# Patient Record
Sex: FEMALE | Race: WHITE | Employment: FULL TIME | URBAN - METROPOLITAN AREA
[De-identification: names, ages, dates, MRNs, and addresses within clinical notes are randomized per-mention and may not be internally consistent; named-entity substitution may affect disease eponyms.]

---

## 2017-11-21 ENCOUNTER — OFFICE VISIT (OUTPATIENT)
Dept: FAMILY MEDICINE CLINIC | Age: 22
End: 2017-11-21

## 2017-11-21 ENCOUNTER — TELEPHONE (OUTPATIENT)
Dept: FAMILY MEDICINE CLINIC | Age: 22
End: 2017-11-21

## 2017-11-21 VITALS
WEIGHT: 216.13 LBS | HEIGHT: 65 IN | HEART RATE: 68 BPM | DIASTOLIC BLOOD PRESSURE: 74 MMHG | TEMPERATURE: 98.2 F | SYSTOLIC BLOOD PRESSURE: 138 MMHG | BODY MASS INDEX: 36.01 KG/M2

## 2017-11-21 DIAGNOSIS — I49.9 CARDIAC ARRHYTHMIA, UNSPECIFIED CARDIAC ARRHYTHMIA TYPE: ICD-10-CM

## 2017-11-21 DIAGNOSIS — G44.89 ALLERGIC HEADACHE: ICD-10-CM

## 2017-11-21 DIAGNOSIS — J45.20 MILD INTERMITTENT ASTHMA WITHOUT COMPLICATION: Primary | ICD-10-CM

## 2017-11-21 PROCEDURE — 99203 OFFICE O/P NEW LOW 30 MIN: CPT | Performed by: FAMILY MEDICINE

## 2017-11-21 RX ORDER — FEXOFENADINE HCL 180 MG/1
180 TABLET ORAL DAILY
Qty: 30 TABLET | Refills: 3 | Status: SHIPPED | OUTPATIENT
Start: 2017-11-21 | End: 2019-02-19

## 2017-11-21 ASSESSMENT — ENCOUNTER SYMPTOMS: SHORTNESS OF BREATH: 0

## 2017-11-21 NOTE — PROGRESS NOTES
Alcohol use Yes      Comment: socially    Drug use: No    Sexual activity: Yes     Partners: Male     Other Topics Concern    None     Social History Narrative    None     Family History   Problem Relation Age of Onset    No Known Problems Mother     No Known Problems Father     No Known Problems Brother     No Known Problems Maternal Grandmother     Diabetes Maternal Grandfather     Prostate Cancer Maternal Grandfather     Cancer Maternal Grandfather      skin    Alzheimer's Disease Paternal Grandmother     Liver Cancer Paternal Grandfather     Alcohol Abuse Paternal Grandfather                               Review Of Systems    Review of Systems   Constitutional: Negative for chills and fever. Respiratory: Negative for shortness of breath. Cardiovascular: Negative for chest pain. Psychiatric/Behavioral: Negative for behavioral problems. PHYSICAL EXAMINATION:    /74 (Site: Right Arm, Position: Sitting, Cuff Size: Medium Adult)   Pulse 68   Temp 98.2 °F (36.8 °C) (Oral)   Ht 5' 4.5\" (1.638 m)   Wt 216 lb 2 oz (98 kg)   LMP 11/03/2017   Breastfeeding? No   BMI 36.52 kg/m²     Physical Exam   Constitutional: She is oriented to person, place, and time. She appears well-developed and well-nourished. No distress. HENT:   Head: Normocephalic and atraumatic. Eyes: Conjunctivae and EOM are normal.   Cardiovascular: Normal rate, regular rhythm and normal heart sounds. Pulmonary/Chest: Effort normal and breath sounds normal.   Neurological: She is alert and oriented to person, place, and time. Skin: Skin is warm and dry. She is not diaphoretic. Psychiatric: She has a normal mood and affect. Vitals reviewed. ASSESSMENT:   Well Adult, See encounter diagnoses  Nikia Hamm was seen today for new patient. Diagnoses and all orders for this visit:    Mild intermittent asthma without complication  Stable.  Continue albuterol PRN    Cardiac arrhythmia, unspecified cardiac arrhythmia type  -     Mihai Hutton MD  -     TSH WITH REFLEX TO FT4    Allergic headache  -     fexofenadine (ALLEGRA ALLERGY) 180 MG tablet; Take 1 tablet by mouth daily          Plan:   See orders and medications filed with this encounter. Labs checked per orders.     Declines flu and Tdap  Return in about 3 months (around 2/21/2018) for physical exam.

## 2017-12-01 ENCOUNTER — TELEPHONE (OUTPATIENT)
Dept: FAMILY MEDICINE CLINIC | Age: 22
End: 2017-12-01

## 2017-12-01 NOTE — TELEPHONE ENCOUNTER
Patient went to see her GYN for birth control and he prescribed her norethindrone. Patient is calling to see if it is okay for her to take this with her blood pressure issues.

## 2017-12-06 NOTE — TELEPHONE ENCOUNTER
Please inform patient that I do not recommend that she take the [facility] with her elevated BP.  Have her discussed with her GYN other options for [facility]

## 2017-12-07 ENCOUNTER — CLINICAL DOCUMENTATION (OUTPATIENT)
Dept: FAMILY MEDICINE CLINIC | Age: 22
End: 2017-12-07

## 2017-12-07 ENCOUNTER — TELEPHONE (OUTPATIENT)
Dept: FAMILY MEDICINE CLINIC | Age: 22
End: 2017-12-07

## 2018-01-02 ENCOUNTER — OFFICE VISIT (OUTPATIENT)
Dept: CARDIOLOGY CLINIC | Age: 23
End: 2018-01-02

## 2018-01-02 VITALS
HEART RATE: 104 BPM | WEIGHT: 220 LBS | OXYGEN SATURATION: 98 % | DIASTOLIC BLOOD PRESSURE: 88 MMHG | HEIGHT: 65 IN | BODY MASS INDEX: 36.65 KG/M2 | SYSTOLIC BLOOD PRESSURE: 130 MMHG

## 2018-01-02 DIAGNOSIS — R00.2 PALPITATIONS: Primary | ICD-10-CM

## 2018-01-02 DIAGNOSIS — Z76.89 ESTABLISHING CARE WITH NEW DOCTOR, ENCOUNTER FOR: ICD-10-CM

## 2018-01-02 PROCEDURE — 93000 ELECTROCARDIOGRAM COMPLETE: CPT | Performed by: INTERNAL MEDICINE

## 2018-01-02 PROCEDURE — 99203 OFFICE O/P NEW LOW 30 MIN: CPT | Performed by: INTERNAL MEDICINE

## 2018-01-02 NOTE — PROGRESS NOTES
[facility]  [phone]      Attending Physician: No att. providers found  Reason for Consultation/Chief Complaint: Palpitations    Subjective   History of Present Illness:  Veronica Cornelius is a 20 y.o. patient who presented to the hospital with UTI in April 2017. She was told she had an irregular heart rate at that time. She states she has a fast heart rate at times and feels like a machine gun. States it lasts minutes and occurs approximately once a week. She has no chest pain with this. There are no known triggers. Denies CP, SOB, syncope and dizziness. Past Medical History:   has a past medical history of Asthma and Headache. Surgical History:   has a past surgical history that includes [place] tooth extraction. Social History:   reports that she has never smoked. She has never used smokeless tobacco. She reports that she drinks alcohol. She reports that she does not use drugs. Family History:  family history includes Alcohol Abuse in her paternal grandfather; Alzheimer's Disease in her paternal grandmother; Cancer in her maternal grandfather; Diabetes in her maternal grandfather; [place] B[place] in her paternal grandfather; No Known Problems in her brother, father, maternal grandmother, and mother; Prostate Cancer in her maternal grandfather. Home Medications:  Were reviewed and are listed in nursing record and/or below  Prior to Admission medications    Medication Sig Start Date End Date Taking? Authorizing Provider   norethindrone (AYGESTIN) 5 MG tablet Take 5 mg by mouth daily   Yes Historical Provider, MD   fexofenadine (ALLEGRA ALLERGY) 180 MG tablet Take 1 tablet by mouth daily 11/21/17  Yes Desiree Payan MD        CURRENT Medications:    No current facility-administered medications for this visit. Allergies:  Review of patient's allergies indicates no known allergies. Review of Systems:   A 14 point review of symptoms completed.  Pertinent positives identified in the HPI, all other review of symptoms negative as below. Objective   PHYSICAL EXAM:    Vitals:    18 0926   BP: 130/88   Pulse: 104   SpO2: 98%    Weight: 220 lb (99.8 kg)       General Appearance:  Alert, cooperative, no distress, appears stated age   Head:  Normocephalic, without obvious abnormality, atraumatic   Eyes:  PERRL, conjunctiva/corneas clear   Nose: Nares normal, no drainage or sinus tenderness   Throat: Lips, mucosa, and tongue normal   Neck: Supple, symmetrical, trachea midline, no adenopathy, thyroid: not enlarged, symmetric, no tenderness/mass/nodules, no carotid bruit or JVD   Lungs:   Clear to auscultation bilaterally, respirations unlabored   Chest Wall:  No deformity or tenderness   Heart:  Regular rate and rhythm, S1, S2 normal, no murmur, rub or gallop   Abdomen:   Soft, non-tender, bowel sounds active all four quadrants,  no masses, no organomegaly   Extremities: Extremities normal, atraumatic, no cyanosis or edema   Pulses: 2+ and symmetric   Skin: Skin color, texture, turgor normal, no rashes or lesions   Pysch: Normal mood and affect   Neurologic: Normal gross motor and sensory exam.         Labs   CBC: No results found for: WBC, RBC, HGB, HCT, MCV, RDW, PLT  CMP:No results found for: NA, K, CL, CO2, BUN, CREATININE, GFRAA, AGRATIO, LABGLOM, GLUCOSE, PROT, CALCIUM, BILITOT, ALKPHOS, AST, ALT  PT/INR:  No results found for: PTINR  HgBA1c:No results found for: LABA1C  No results found for: CKTOTAL, CKMB, CKMBINDEX, TROPONINI      Cardiac Data     Last EK2018 NSR with normal intervals      Assessment and Plan      1. Palpitations: short bursts of racing tachycardia   - report of abnormal \"irregular tachycardia\" at hospital in [facility] during episode of teja WILLIAMSON PLAN  1. Attempt to get records and strips from [facility] hospital  2. 2 wks monitor for continued symptoms  3. Lab work. Patient Active Problem List   Diagnosis    Palpitations       PLAN:  1.

## 2018-01-02 NOTE — LETTER
[address] [place] [address] [address] [address]  Phone: [phone]  Fax: [phone]    Kylie Grande MD        January 2, 2018     Neil Mendez    Patient: Mayra Matute  MR Number: [id number]  YOB: 1995  Date of Visit: 1/2/2018    Dear Dr. Rajinder Hagan: Thank you for allowing me to participate in the care of Mayra Matute. Below are the relevant portions of my assessment and plan of care. Assessment and Plan      1. Palpitations: short bursts of racing tachycardia              - report of abnormal \"irregular tachycardia\" at hospital in [facility] during episode of teja WILLIAM[place] PLAN  1. Attempt to get records and strips from [facility] hospital  2. 2 wks monitor for continued symptoms  3. Lab work.         Patient Active Problem List   Diagnosis    Palpitations       PLAN:  1. We will obtain records from [facility]  2. Labs cbc, tsh, cmp, urine pregnancy  3. Cardiac event monitor (2 week) Do normal activity  4. Echo cardiogram  5. Follow up with me after testing        If you have questions, please do not hesitate to call me. I look forward to following [place] along with you.     Sincerely,        Kylie Grande MD

## 2018-01-25 PROCEDURE — 93228 REMOTE 30 DAY ECG REV/REPORT: CPT | Performed by: INTERNAL MEDICINE

## 2018-01-26 DIAGNOSIS — R00.2 PALPITATIONS: ICD-10-CM

## 2018-01-29 ENCOUNTER — TELEPHONE (OUTPATIENT)
Dept: CARDIOLOGY CLINIC | Age: 23
End: 2018-01-29

## 2018-01-29 NOTE — TELEPHONE ENCOUNTER
I spoke with patient and gave her monitor results. She states she will also be having her blood work and echocardiogram done soon.

## 2018-01-29 NOTE — TELEPHONE ENCOUNTER
LMOVM please return call for monitor results:  Rare PAC  No Significant events  No Ectopy with symptom per JJP.

## 2018-02-16 ENCOUNTER — OFFICE VISIT (OUTPATIENT)
Dept: CARDIOLOGY CLINIC | Age: 23
End: 2018-02-16

## 2018-02-16 VITALS
WEIGHT: 215 LBS | DIASTOLIC BLOOD PRESSURE: 60 MMHG | HEART RATE: 103 BPM | SYSTOLIC BLOOD PRESSURE: 104 MMHG | OXYGEN SATURATION: 98 % | BODY MASS INDEX: 36.7 KG/M2 | HEIGHT: 64 IN

## 2018-02-16 DIAGNOSIS — R42 LIGHTHEADEDNESS: ICD-10-CM

## 2018-02-16 DIAGNOSIS — R00.2 PALPITATIONS: Primary | ICD-10-CM

## 2018-02-16 PROCEDURE — 99213 OFFICE O/P EST LOW 20 MIN: CPT | Performed by: INTERNAL MEDICINE

## 2018-02-16 NOTE — PROGRESS NOTES
[facility]   Follow up  [phone]      Attending Physician: No att. providers found  Reason for Consultation/Chief Complaint: Palpitations    Subjective   History of Present Illness:  Alva Holland is a 20 y.o. patient who presented to the hospital with UTI in April 2017. She was told she had an irregular heart rate at that time. She states she has a fast heart rate at times and feels like a machine gun. States it lasts minutes and occurs approximately once a week. She has no chest pain with this. There are no known triggers. Denies CP, SOB, syncope and dizziness. Her 2 week cardiac event monitor showed rare PACs and sinus rhythm. She reports she feels roughly the same from her last visit. She feels that she felt her symptoms while wearing the monitor. She tolerates activity well. She had an episode of random lightheadedness 2 weeks ago. This occurred while sitting with no exacerbating factor. She continues to feels occasional chest tightness randomly. She chest her heart rate at home and has noted that it is at times faster that \"normal\". She denies syncope, dizziness, shortness of breath or edema. Past Medical History:   has a past medical history of Asthma and Headache. Surgical History:   has a past surgical history that includes [place] tooth extraction. Social History:   reports that she has never smoked. She has never used smokeless tobacco. She reports that she drinks alcohol. She reports that she does not use drugs. Family History:  family history includes Alcohol Abuse in her paternal grandfather; Alzheimer's Disease in her paternal grandmother; Cancer in her maternal grandfather; Diabetes in her maternal grandfather; [place] in her paternal grandfather; No Known Problems in her brother, father, maternal grandmother, and mother; Prostate Cancer in her maternal grandfather.       Home Medications:  Were reviewed and are listed in nursing record and/or below  Prior

## 2018-04-09 ENCOUNTER — CLINICAL DOCUMENTATION (OUTPATIENT)
Dept: PSYCHOLOGY | Age: 23
End: 2018-04-09

## 2018-04-09 ENCOUNTER — OFFICE VISIT (OUTPATIENT)
Dept: FAMILY MEDICINE CLINIC | Age: 23
End: 2018-04-09

## 2018-04-09 ENCOUNTER — TELEPHONE (OUTPATIENT)
Dept: FAMILY MEDICINE CLINIC | Age: 23
End: 2018-04-09

## 2018-04-09 VITALS
TEMPERATURE: 97.9 F | HEART RATE: 77 BPM | BODY MASS INDEX: 36.37 KG/M2 | OXYGEN SATURATION: 98 % | SYSTOLIC BLOOD PRESSURE: 116 MMHG | WEIGHT: 213 LBS | HEIGHT: 64 IN | DIASTOLIC BLOOD PRESSURE: 74 MMHG

## 2018-04-09 DIAGNOSIS — F41.9 ANXIETY: ICD-10-CM

## 2018-04-09 DIAGNOSIS — Z11.8 SCREENING FOR CHLAMYDIAL DISEASE: ICD-10-CM

## 2018-04-09 DIAGNOSIS — Z00.00 ANNUAL PHYSICAL EXAM: Primary | ICD-10-CM

## 2018-04-09 DIAGNOSIS — Z13.220 LIPID SCREENING: ICD-10-CM

## 2018-04-09 DIAGNOSIS — Z11.8 SCREENING FOR CHLAMYDIAL DISEASE: Primary | ICD-10-CM

## 2018-04-09 PROCEDURE — 99395 PREV VISIT EST AGE 18-39: CPT | Performed by: FAMILY MEDICINE

## 2018-04-09 PROCEDURE — 36415 COLL VENOUS BLD VENIPUNCTURE: CPT | Performed by: FAMILY MEDICINE

## 2018-04-09 RX ORDER — BUPROPION HYDROCHLORIDE 150 MG/1
150 TABLET ORAL EVERY MORNING
Qty: 30 TABLET | Refills: 0 | Status: SHIPPED | OUTPATIENT
Start: 2018-04-09 | End: 2018-05-03 | Stop reason: SDUPTHER

## 2018-04-09 RX ORDER — ACETAMINOPHEN AND CODEINE PHOSPHATE 120; 12 MG/5ML; MG/5ML
SOLUTION ORAL
Refills: 3 | COMMUNITY
Start: 2018-03-12

## 2018-04-09 ASSESSMENT — ENCOUNTER SYMPTOMS
SHORTNESS OF BREATH: 0
CONSTIPATION: 0
NAUSEA: 0
ABDOMINAL PAIN: 0
VOMITING: 0
DIARRHEA: 0

## 2018-04-09 ASSESSMENT — PATIENT HEALTH QUESTIONNAIRE - PHQ9
SUM OF ALL RESPONSES TO PHQ QUESTIONS 1-9: 0
SUM OF ALL RESPONSES TO PHQ9 QUESTIONS 1 & 2: 0
2. FEELING DOWN, DEPRESSED OR HOPELESS: 0
1. LITTLE INTEREST OR PLEASURE IN DOING THINGS: 0

## 2018-04-09 NOTE — TELEPHONE ENCOUNTER
LM for pt to call back to schedule a lab appt - she added a urine test and would like her to get the remaining labs from Dr. Max Rios drawn.

## 2018-04-10 LAB
CHOLESTEROL, TOTAL: 154 MG/DL (ref 0–199)
HDLC SERPL-MCNC: 65 MG/DL (ref 40–60)
LDL CHOLESTEROL CALCULATED: 67 MG/DL
TRIGL SERPL-MCNC: 110 MG/DL (ref 0–150)
VLDLC SERPL CALC-MCNC: 22 MG/DL

## 2018-04-27 ENCOUNTER — OFFICE VISIT (OUTPATIENT)
Dept: PSYCHOLOGY | Age: 23
End: 2018-04-27

## 2018-04-27 DIAGNOSIS — F41.9 ANXIETY: Primary | ICD-10-CM

## 2018-04-27 PROCEDURE — 90791 PSYCH DIAGNOSTIC EVALUATION: CPT | Performed by: PSYCHOLOGIST

## 2018-04-27 ASSESSMENT — ANXIETY QUESTIONNAIRES
7. FEELING AFRAID AS IF SOMETHING AWFUL MIGHT HAPPEN: 2-OVER HALF THE DAYS
1. FEELING NERVOUS, ANXIOUS, OR ON EDGE: 3-NEARLY EVERY DAY
5. BEING SO RESTLESS THAT IT IS HARD TO SIT STILL: 2-OVER HALF THE DAYS
3. WORRYING TOO MUCH ABOUT DIFFERENT THINGS: 3-NEARLY EVERY DAY
6. BECOMING EASILY ANNOYED OR IRRITABLE: 0-NOT AT ALL SURE
2. NOT BEING ABLE TO STOP OR CONTROL WORRYING: 3-NEARLY EVERY DAY
4. TROUBLE RELAXING: 3-NEARLY EVERY DAY
GAD7 TOTAL SCORE: 16

## 2018-04-27 ASSESSMENT — PATIENT HEALTH QUESTIONNAIRE - PHQ9
1. LITTLE INTEREST OR PLEASURE IN DOING THINGS: 0
3. TROUBLE FALLING OR STAYING ASLEEP: 0
6. FEELING BAD ABOUT YOURSELF - OR THAT YOU ARE A FAILURE OR HAVE LET YOURSELF OR YOUR FAMILY DOWN: 1
9. THOUGHTS THAT YOU WOULD BE BETTER OFF DEAD, OR OF HURTING YOURSELF: 0
4. FEELING TIRED OR HAVING LITTLE ENERGY: 0
7. TROUBLE CONCENTRATING ON THINGS, SUCH AS READING THE NEWSPAPER OR WATCHING TELEVISION: 0
5. POOR APPETITE OR OVEREATING: 2
8. MOVING OR SPEAKING SO SLOWLY THAT OTHER PEOPLE COULD HAVE NOTICED. OR THE OPPOSITE, BEING SO FIGETY OR RESTLESS THAT YOU HAVE BEEN MOVING AROUND A LOT MORE THAN USUAL: 1
2. FEELING DOWN, DEPRESSED OR HOPELESS: 1
SUM OF ALL RESPONSES TO PHQ QUESTIONS 1-9: 5
SUM OF ALL RESPONSES TO PHQ9 QUESTIONS 1 & 2: 1

## 2018-05-03 ENCOUNTER — OFFICE VISIT (OUTPATIENT)
Dept: FAMILY MEDICINE CLINIC | Age: 23
End: 2018-05-03

## 2018-05-03 VITALS
BODY MASS INDEX: 36.37 KG/M2 | DIASTOLIC BLOOD PRESSURE: 57 MMHG | OXYGEN SATURATION: 98 % | TEMPERATURE: 97.7 F | HEART RATE: 82 BPM | HEIGHT: 64 IN | WEIGHT: 213 LBS | SYSTOLIC BLOOD PRESSURE: 121 MMHG

## 2018-05-03 DIAGNOSIS — F41.9 ANXIETY: Primary | ICD-10-CM

## 2018-05-03 DIAGNOSIS — Z11.3 SCREENING FOR STDS (SEXUALLY TRANSMITTED DISEASES): ICD-10-CM

## 2018-05-03 LAB
ALBUMIN SERPL-MCNC: 5 G/DL (ref 3.4–5)
ANION GAP SERPL CALCULATED.3IONS-SCNC: 19 MMOL/L (ref 3–16)
BASOPHILS ABSOLUTE: 0.1 K/UL (ref 0–0.2)
BASOPHILS RELATIVE PERCENT: 1 %
BUN BLDV-MCNC: 11 MG/DL (ref 7–20)
CALCIUM SERPL-MCNC: 9.3 MG/DL (ref 8.3–10.6)
CHLORIDE BLD-SCNC: 102 MMOL/L (ref 99–110)
CO2: 23 MMOL/L (ref 21–32)
CREAT SERPL-MCNC: 0.6 MG/DL (ref 0.6–1.1)
EOSINOPHILS ABSOLUTE: 0.1 K/UL (ref 0–0.6)
EOSINOPHILS RELATIVE PERCENT: 1.6 %
GFR AFRICAN AMERICAN: >60
GFR NON-AFRICAN AMERICAN: >60
GLUCOSE BLD-MCNC: 77 MG/DL (ref 70–99)
HCG(URINE) PREGNANCY TEST: NEGATIVE
HCT VFR BLD CALC: 45.3 % (ref 36–48)
HEMOGLOBIN: 15.6 G/DL (ref 12–16)
LYMPHOCYTES ABSOLUTE: 1.8 K/UL (ref 1–5.1)
LYMPHOCYTES RELATIVE PERCENT: 26.9 %
MCH RBC QN AUTO: 30.9 PG (ref 26–34)
MCHC RBC AUTO-ENTMCNC: 34.5 G/DL (ref 31–36)
MCV RBC AUTO: 89.6 FL (ref 80–100)
MONOCYTES ABSOLUTE: 0.5 K/UL (ref 0–1.3)
MONOCYTES RELATIVE PERCENT: 6.9 %
NEUTROPHILS ABSOLUTE: 4.4 K/UL (ref 1.7–7.7)
NEUTROPHILS RELATIVE PERCENT: 63.6 %
PDW BLD-RTO: 12.9 % (ref 12.4–15.4)
PHOSPHORUS: 3 MG/DL (ref 2.5–4.9)
PLATELET # BLD: 261 K/UL (ref 135–450)
PMV BLD AUTO: 9.4 FL (ref 5–10.5)
POTASSIUM SERPL-SCNC: 4.3 MMOL/L (ref 3.5–5.1)
RBC # BLD: 5.06 M/UL (ref 4–5.2)
SODIUM BLD-SCNC: 144 MMOL/L (ref 136–145)
TSH REFLEX FT4: 2.1 UIU/ML (ref 0.27–4.2)
WBC # BLD: 6.8 K/UL (ref 4–11)

## 2018-05-03 PROCEDURE — 99213 OFFICE O/P EST LOW 20 MIN: CPT | Performed by: FAMILY MEDICINE

## 2018-05-03 RX ORDER — BUPROPION HYDROCHLORIDE 150 MG/1
150 TABLET ORAL EVERY MORNING
Qty: 90 TABLET | Refills: 1 | Status: SHIPPED | OUTPATIENT
Start: 2018-05-03 | End: 2019-02-19

## 2018-05-03 ASSESSMENT — ENCOUNTER SYMPTOMS: SHORTNESS OF BREATH: 0

## 2018-05-04 ENCOUNTER — TELEPHONE (OUTPATIENT)
Dept: CARDIOLOGY CLINIC | Age: 23
End: 2018-05-04

## 2018-05-04 LAB
C. TRACHOMATIS DNA ,URINE: NEGATIVE
N. GONORRHOEAE DNA, URINE: NEGATIVE

## 2018-05-07 DIAGNOSIS — F41.9 ANXIETY: ICD-10-CM

## 2018-05-07 RX ORDER — BUPROPION HYDROCHLORIDE 150 MG/1
150 TABLET ORAL EVERY MORNING
Qty: 30 TABLET | Refills: 0 | OUTPATIENT
Start: 2018-05-07

## 2018-05-10 ENCOUNTER — TELEPHONE (OUTPATIENT)
Dept: CARDIOLOGY CLINIC | Age: 23
End: 2018-05-10

## 2018-05-11 ENCOUNTER — TELEPHONE (OUTPATIENT)
Dept: FAMILY MEDICINE CLINIC | Age: 23
End: 2018-05-11

## 2018-05-21 ENCOUNTER — OFFICE VISIT (OUTPATIENT)
Dept: PSYCHOLOGY | Age: 23
End: 2018-05-21

## 2018-05-21 DIAGNOSIS — F41.9 ANXIETY: Primary | ICD-10-CM

## 2018-05-21 PROCEDURE — 90832 PSYTX W PT 30 MINUTES: CPT | Performed by: PSYCHOLOGIST

## 2018-06-13 ENCOUNTER — TELEPHONE (OUTPATIENT)
Dept: FAMILY MEDICINE CLINIC | Age: 23
End: 2018-06-13

## 2019-02-19 ENCOUNTER — OFFICE VISIT (OUTPATIENT)
Dept: FAMILY MEDICINE CLINIC | Age: 24
End: 2019-02-19
Payer: COMMERCIAL

## 2019-02-19 VITALS
OXYGEN SATURATION: 99 % | DIASTOLIC BLOOD PRESSURE: 70 MMHG | SYSTOLIC BLOOD PRESSURE: 116 MMHG | WEIGHT: 220 LBS | BODY MASS INDEX: 37.56 KG/M2 | HEART RATE: 67 BPM | HEIGHT: 64 IN | RESPIRATION RATE: 16 BRPM

## 2019-02-19 DIAGNOSIS — Z23 NEED FOR DIPHTHERIA-TETANUS-PERTUSSIS (TDAP) VACCINE: ICD-10-CM

## 2019-02-19 DIAGNOSIS — Z00.00 ANNUAL PHYSICAL EXAM: Primary | ICD-10-CM

## 2019-02-19 PROCEDURE — 90715 TDAP VACCINE 7 YRS/> IM: CPT | Performed by: FAMILY MEDICINE

## 2019-02-19 PROCEDURE — 90471 IMMUNIZATION ADMIN: CPT | Performed by: FAMILY MEDICINE

## 2019-02-19 PROCEDURE — 99395 PREV VISIT EST AGE 18-39: CPT | Performed by: FAMILY MEDICINE

## 2019-02-19 ASSESSMENT — ENCOUNTER SYMPTOMS
ABDOMINAL PAIN: 0
CONSTIPATION: 0
DIARRHEA: 0
SHORTNESS OF BREATH: 0
VOMITING: 0
NAUSEA: 0

## 2019-02-19 ASSESSMENT — PATIENT HEALTH QUESTIONNAIRE - PHQ9
2. FEELING DOWN, DEPRESSED OR HOPELESS: 0
SUM OF ALL RESPONSES TO PHQ QUESTIONS 1-9: 0
1. LITTLE INTEREST OR PLEASURE IN DOING THINGS: 0
SUM OF ALL RESPONSES TO PHQ9 QUESTIONS 1 & 2: 0
SUM OF ALL RESPONSES TO PHQ QUESTIONS 1-9: 0

## 2019-03-22 DIAGNOSIS — G44.89 ALLERGIC HEADACHE: ICD-10-CM

## 2019-03-25 RX ORDER — FEXOFENADINE HCL 180 MG/1
180 TABLET ORAL DAILY
Qty: 30 TABLET | Refills: 3 | Status: SHIPPED | OUTPATIENT
Start: 2019-03-25 | End: 2020-09-11

## 2020-03-29 ENCOUNTER — TELEPHONE (OUTPATIENT)
Dept: FAMILY MEDICINE CLINIC | Age: 25
End: 2020-03-29

## 2020-03-29 RX ORDER — ALBUTEROL SULFATE 90 UG/1
2 AEROSOL, METERED RESPIRATORY (INHALATION) EVERY 4 HOURS PRN
Qty: 1 INHALER | Refills: 1 | Status: SHIPPED | OUTPATIENT
Start: 2020-03-29 | End: 2020-09-11

## 2020-09-11 ENCOUNTER — OFFICE VISIT (OUTPATIENT)
Dept: FAMILY MEDICINE CLINIC | Age: 25
End: 2020-09-11
Payer: COMMERCIAL

## 2020-09-11 VITALS
TEMPERATURE: 97.5 F | SYSTOLIC BLOOD PRESSURE: 122 MMHG | HEART RATE: 86 BPM | OXYGEN SATURATION: 98 % | DIASTOLIC BLOOD PRESSURE: 78 MMHG | HEIGHT: 64 IN | BODY MASS INDEX: 42.34 KG/M2 | WEIGHT: 248 LBS

## 2020-09-11 LAB
A/G RATIO: 2 (ref 1.1–2.2)
ALBUMIN SERPL-MCNC: 4.6 G/DL (ref 3.4–5)
ALP BLD-CCNC: 78 U/L (ref 40–129)
ALT SERPL-CCNC: 34 U/L (ref 10–40)
ANION GAP SERPL CALCULATED.3IONS-SCNC: 13 MMOL/L (ref 3–16)
AST SERPL-CCNC: 31 U/L (ref 15–37)
BASOPHILS ABSOLUTE: 0.1 K/UL (ref 0–0.2)
BASOPHILS RELATIVE PERCENT: 0.9 %
BILIRUB SERPL-MCNC: 0.6 MG/DL (ref 0–1)
BUN BLDV-MCNC: 9 MG/DL (ref 7–20)
CALCIUM SERPL-MCNC: 9.2 MG/DL (ref 8.3–10.6)
CHLORIDE BLD-SCNC: 104 MMOL/L (ref 99–110)
CHOLESTEROL, TOTAL: 185 MG/DL (ref 0–199)
CO2: 24 MMOL/L (ref 21–32)
CREAT SERPL-MCNC: 0.7 MG/DL (ref 0.6–1.1)
EOSINOPHILS ABSOLUTE: 0.3 K/UL (ref 0–0.6)
EOSINOPHILS RELATIVE PERCENT: 4.1 %
FOLLICLE STIMULATING HORMONE: 5.1 MIU/ML
GFR AFRICAN AMERICAN: >60
GFR NON-AFRICAN AMERICAN: >60
GLOBULIN: 2.3 G/DL
GLUCOSE BLD-MCNC: 95 MG/DL (ref 70–99)
HCT VFR BLD CALC: 44.6 % (ref 36–48)
HDLC SERPL-MCNC: 56 MG/DL (ref 40–60)
HEMOGLOBIN: 15.3 G/DL (ref 12–16)
LDL CHOLESTEROL CALCULATED: 109 MG/DL
LUTEINIZING HORMONE: 5.6 MIU/ML
LYMPHOCYTES ABSOLUTE: 2.3 K/UL (ref 1–5.1)
LYMPHOCYTES RELATIVE PERCENT: 28.4 %
MCH RBC QN AUTO: 30.4 PG (ref 26–34)
MCHC RBC AUTO-ENTMCNC: 34.2 G/DL (ref 31–36)
MCV RBC AUTO: 88.7 FL (ref 80–100)
MONOCYTES ABSOLUTE: 0.6 K/UL (ref 0–1.3)
MONOCYTES RELATIVE PERCENT: 7.6 %
NEUTROPHILS ABSOLUTE: 4.7 K/UL (ref 1.7–7.7)
NEUTROPHILS RELATIVE PERCENT: 59 %
PDW BLD-RTO: 12.3 % (ref 12.4–15.4)
PLATELET # BLD: 255 K/UL (ref 135–450)
PMV BLD AUTO: 9.2 FL (ref 5–10.5)
POTASSIUM SERPL-SCNC: 4.4 MMOL/L (ref 3.5–5.1)
RBC # BLD: 5.03 M/UL (ref 4–5.2)
SODIUM BLD-SCNC: 141 MMOL/L (ref 136–145)
TOTAL PROTEIN: 6.9 G/DL (ref 6.4–8.2)
TRIGL SERPL-MCNC: 98 MG/DL (ref 0–150)
TSH REFLEX FT4: 3.46 UIU/ML (ref 0.27–4.2)
VLDLC SERPL CALC-MCNC: 20 MG/DL
WBC # BLD: 8 K/UL (ref 4–11)

## 2020-09-11 PROCEDURE — 99214 OFFICE O/P EST MOD 30 MIN: CPT | Performed by: REGISTERED NURSE

## 2020-09-11 ASSESSMENT — PATIENT HEALTH QUESTIONNAIRE - PHQ9
1. LITTLE INTEREST OR PLEASURE IN DOING THINGS: 0
2. FEELING DOWN, DEPRESSED OR HOPELESS: 0
SUM OF ALL RESPONSES TO PHQ QUESTIONS 1-9: 0
SUM OF ALL RESPONSES TO PHQ QUESTIONS 1-9: 0
SUM OF ALL RESPONSES TO PHQ9 QUESTIONS 1 & 2: 0

## 2020-09-11 ASSESSMENT — ENCOUNTER SYMPTOMS
CONSTIPATION: 0
DIARRHEA: 0
NAUSEA: 0
EYES NEGATIVE: 1
VOMITING: 0
SHORTNESS OF BREATH: 0

## 2020-09-11 NOTE — PROGRESS NOTES
Medications    Medication Sig Taking? Authorizing Provider   norethindrone (MICRONOR) 0.35 MG tablet TAKE 1 TABLET BY ORAL ROUTE EVERY DAY Yes Historical Provider, MD        Social History     Tobacco Use    Smoking status: Never Smoker    Smokeless tobacco: Never Used   Substance Use Topics    Alcohol use: Yes     Comment: socially        Vitals:    09/11/20 0803   BP: 122/78   Pulse: 86   Temp: 97.5 °F (36.4 °C)   SpO2: 98%   Weight: 248 lb (112.5 kg)   Height: 5' 4\" (1.626 m)     Estimated body mass index is 42.57 kg/m² as calculated from the following:    Height as of this encounter: 5' 4\" (1.626 m). Weight as of this encounter: 248 lb (112.5 kg). Physical Exam  Constitutional:       General: She is not in acute distress. Appearance: Normal appearance. She is obese. HENT:      Head: Normocephalic and atraumatic. Right Ear: Tympanic membrane, ear canal and external ear normal. There is no impacted cerumen. Left Ear: Tympanic membrane, ear canal and external ear normal. There is no impacted cerumen. Nose: No congestion or rhinorrhea. Mouth/Throat:      Mouth: Mucous membranes are moist.      Pharynx: Oropharynx is clear. No oropharyngeal exudate or posterior oropharyngeal erythema. Eyes:      General: No scleral icterus. Extraocular Movements: Extraocular movements intact. Conjunctiva/sclera: Conjunctivae normal.      Pupils: Pupils are equal, round, and reactive to light. Neck:      Musculoskeletal: Normal range of motion and neck supple. Cardiovascular:      Rate and Rhythm: Normal rate and regular rhythm. Pulses: Normal pulses. Heart sounds: Normal heart sounds. No murmur. No gallop. Pulmonary:      Effort: Pulmonary effort is normal.      Breath sounds: Normal breath sounds. No wheezing, rhonchi or rales. Abdominal:      General: Abdomen is flat. Palpations: Abdomen is soft. There is no mass. Tenderness:  There is no abdominal tenderness. There is no guarding. Hernia: No hernia is present. Musculoskeletal: Normal range of motion. Right lower leg: No edema. Left lower leg: No edema. Lymphadenopathy:      Cervical: No cervical adenopathy. Skin:     General: Skin is warm and dry. Capillary Refill: Capillary refill takes less than 2 seconds. Comments: acanthosis nigricans noted on posterior aspect of neck, small amount of darker hair on jaw line bilaterally   Neurological:      General: No focal deficit present. Mental Status: She is alert and oriented to person, place, and time. Psychiatric:         Mood and Affect: Mood normal.         Behavior: Behavior normal.         Thought Content: Thought content normal.         Judgment: Judgment normal.         ASSESSMENT/PLAN:    Patient with irregular periods that have not improved with norethidrone. Will obtain labs to rule out PCOS, thyroid dysfunction, diabetes. Discussed plan for management of nutrition, exercise, sleep and stress. Patient to monitor her nutrition and exercise- goal is 60 minutes of exercise three times a week. She will make time for meditation/prayer for at least 1 minute each day as a measure for self care and stress management. Patient will follow up in 1 month to assess goals and progress. Differential diagnoses- PCOS, diabetes, thyroid dysfunction, stress, insomnia, overeating/excessive caloric intake, lack of activity/exercise, poor nutrition    1. Irregular periods/menstrual cycles    - TSH WITH REFLEX TO FT4  - Comprehensive Metabolic Panel  - CBC Auto Differential  - Follicle Stimulating Hormone  - LUTEINIZING HORMONE  - Hemoglobin A1C    2. Excessive hair growth    - TSH WITH REFLEX TO FT4  - Testosterone non male  - Follicle Stimulating Hormone  - LUTEINIZING HORMONE  - Hemoglobin A1C    3. Sleep difficulties  Discussed sleep, importance of good sleep for overall health, weight loss.  Wakes multiple times in the night and

## 2020-09-12 LAB
ESTIMATED AVERAGE GLUCOSE: 99.7 MG/DL
HBA1C MFR BLD: 5.1 %

## 2020-09-15 LAB
SEX HORMONE BINDING GLOBULIN: 24 NMOL/L (ref 30–135)
TESTOSTERONE FREE-NONMALE: 9.6 PG/ML (ref 0.8–7.4)
TESTOSTERONE TOTAL: 45 NG/DL (ref 20–70)

## 2020-09-21 ENCOUNTER — TELEPHONE (OUTPATIENT)
Dept: PULMONOLOGY | Age: 25
End: 2020-09-21

## 2020-09-21 ENCOUNTER — TELEPHONE (OUTPATIENT)
Dept: FAMILY MEDICINE CLINIC | Age: 25
End: 2020-09-21

## 2020-09-21 NOTE — TELEPHONE ENCOUNTER
The patient called to get the results of her testosterone, free, total test.     Please advise    Carson Odell [phone]

## 2020-09-21 NOTE — TELEPHONE ENCOUNTER
limited to the following:    Your Provider(s) may not able to provide medical treatment for your particular condition and you may be required to seek alternative healthcare or emergency care services.  The electronic systems or other security protocols or safeguards used in the Service could fail, causing a breach of privacy of your medical or other information.  Given regulatory requirements in certain jurisdictions, your Provider(s) diagnosis and/or treatment options, especially pertaining to certain prescriptions, may be limited. Acceptance   1. You understand that Services will be provided via Telehealth. This process involves the use of HIPAA compliant and secure, real-time audio-visual interfacing with a qualified and appropriately trained provider located at [facility]. 2. You understand that, under no circumstances, will this session be recorded. 3. You understand that the Provider(s) at [facility] and other clinical participants will be party to the information obtained during the Telehealth session in accordance with best medical practices. 4. You understand that the information obtained during the Telehealth session will be used to help determine the most appropriate treatment options. 5. You understand that You have the right to revoke this consent at any point in time. 6. You understand that Telehealth is voluntary, and that continued treatment is not dependent upon consent. 7. You understand that, in the event of non-consent to Telehealth services and/or technical difficulties, you will obtain services as typically provided in the absence of Telehealth technology. 8. You understand that this consent will be kept in Your medical record. 9. No potential benefits from the use of Telehealth or specific results can be guaranteed. Your condition may not be cured or improved and, in some cases, may get worse.    10. There are limitations in the terms described in the Terms of Service and this Telehealth Consent. The patient was read the following statement and has consented to the visit as of 9/21/20. The patient has been scheduled for their first telehealth visit on 10/29/20 with [facility].

## 2020-10-19 ENCOUNTER — TELEPHONE (OUTPATIENT)
Dept: PULMONOLOGY | Age: 25
End: 2020-10-19

## 2021-02-22 DIAGNOSIS — R05.9 COUGH: ICD-10-CM

## 2021-02-22 DIAGNOSIS — U07.1 COVID-19: Primary | ICD-10-CM

## 2021-02-22 RX ORDER — BUDESONIDE AND FORMOTEROL FUMARATE DIHYDRATE 160; 4.5 UG/1; UG/1
2 AEROSOL RESPIRATORY (INHALATION) 2 TIMES DAILY
Qty: 1 INHALER | Refills: 0 | Status: SHIPPED | OUTPATIENT
Start: 2021-02-22 | End: 2021-04-14

## 2021-04-14 DIAGNOSIS — R05.9 COUGH: ICD-10-CM

## 2021-04-14 DIAGNOSIS — U07.1 COVID-19: ICD-10-CM

## 2021-04-14 RX ORDER — BUDESONIDE AND FORMOTEROL FUMARATE DIHYDRATE 160; 4.5 UG/1; UG/1
AEROSOL RESPIRATORY (INHALATION)
Qty: 1 INHALER | Refills: 0 | OUTPATIENT
Start: 2021-04-14

## 2021-07-01 ENCOUNTER — TELEPHONE (OUTPATIENT)
Dept: FAMILY MEDICINE CLINIC | Age: 26
End: 2021-07-01

## 2021-07-01 NOTE — TELEPHONE ENCOUNTER
----- Message from Rashida Isaacs sent at 7/1/2021  8:43 AM EDT -----  Subject: Message to Provider    QUESTIONS  Information for Provider? Patient called to inform office she will be late   for her 8:40 appt today but every time we called the phone hung up right   away or it gave a busy signal.  ---------------------------------------------------------------------------  --------------  [address]  What is the best way for the office to contact you? OK to leave message on   voicemail  Preferred Call Back Phone Number? [phone]  ---------------------------------------------------------------------------  --------------  SCRIPT ANSWERS  Relationship to Patient?  Self

## 2022-02-16 ENCOUNTER — OFFICE VISIT (OUTPATIENT)
Dept: FAMILY MEDICINE CLINIC | Age: 27
End: 2022-02-16
Payer: COMMERCIAL

## 2022-02-16 VITALS
DIASTOLIC BLOOD PRESSURE: 74 MMHG | WEIGHT: 268 LBS | BODY MASS INDEX: 45.75 KG/M2 | HEIGHT: 64 IN | HEART RATE: 108 BPM | SYSTOLIC BLOOD PRESSURE: 126 MMHG | OXYGEN SATURATION: 97 %

## 2022-02-16 DIAGNOSIS — S29.011A MUSCLE STRAIN OF CHEST WALL, INITIAL ENCOUNTER: Primary | ICD-10-CM

## 2022-02-16 PROCEDURE — 1036F TOBACCO NON-USER: CPT | Performed by: REGISTERED NURSE

## 2022-02-16 PROCEDURE — G8427 DOCREV CUR MEDS BY ELIG CLIN: HCPCS | Performed by: REGISTERED NURSE

## 2022-02-16 PROCEDURE — G8484 FLU IMMUNIZE NO ADMIN: HCPCS | Performed by: REGISTERED NURSE

## 2022-02-16 PROCEDURE — 99213 OFFICE O/P EST LOW 20 MIN: CPT | Performed by: REGISTERED NURSE

## 2022-02-16 PROCEDURE — G8417 CALC BMI ABV UP PARAM F/U: HCPCS | Performed by: REGISTERED NURSE

## 2022-02-16 RX ORDER — MELOXICAM 15 MG/1
15 TABLET ORAL DAILY PRN
Qty: 30 TABLET | Refills: 1 | Status: SHIPPED | OUTPATIENT
Start: 2022-02-16

## 2022-02-16 ASSESSMENT — PATIENT HEALTH QUESTIONNAIRE - PHQ9
SUM OF ALL RESPONSES TO PHQ9 QUESTIONS 1 & 2: 0
SUM OF ALL RESPONSES TO PHQ QUESTIONS 1-9: 0
2. FEELING DOWN, DEPRESSED OR HOPELESS: 0
1. LITTLE INTEREST OR PLEASURE IN DOING THINGS: 0

## 2022-02-16 ASSESSMENT — ENCOUNTER SYMPTOMS
SHORTNESS OF BREATH: 0
WHEEZING: 0
CHEST TIGHTNESS: 0
BACK PAIN: 0

## 2022-02-16 NOTE — PROGRESS NOTES
Patient: Ne Calloway is a 31 y.o. female who presents today with the following Chief Complaint(s):  Chief Complaint   Patient presents with    Muscle Pain     Chest-pulled muscle on 14th       Assessment/Plan:  1. Muscle strain of chest wall, initial encounter  Localized tenderness to right chest musculature after reaching/grabbing/pulling something away from her dog. Her pain started immediately after this movement. Likely a muscle strain. She has had improvement of her symptoms with ibuprofen. We will start her on meloxicam daily for 2 weeks. Ice to area as needed. Call for any questions concerns or worsening symptoms. - meloxicam (MOBIC) 15 MG tablet; Take 1 tablet by mouth daily as needed for Pain  Dispense: 30 tablet; Refill: 1      Return if symptoms worsen or fail to improve. HPI:     She started having pain in the muscles of her chest after grabbing something from her dog. She immediately felt pain and this has not resolved. Her pain is better than yesterday. She took ibuprofen and this helped. No shortness of breath, arm pain, neck pain or back pain. Current Outpatient Medications   Medication Sig Dispense Refill    meloxicam (MOBIC) 15 MG tablet Take 1 tablet by mouth daily as needed for Pain 30 tablet 1    norethindrone (MICRONOR) 0.35 MG tablet TAKE 1 TABLET BY ORAL ROUTE EVERY DAY  3     No current facility-administered medications for this visit. Review of Systems   Constitutional: Negative. Respiratory: Negative for chest tightness, shortness of breath and wheezing. Cardiovascular: Negative for chest pain, palpitations and leg swelling. Musculoskeletal: Positive for myalgias ( pain in right chest wall). Negative for arthralgias, back pain and neck pain. Skin: Negative. Neurological: Negative.         Vitals:    02/16/22 1358   BP: 126/74   Pulse: 108   SpO2: 97%   Weight: 268 lb (121.6 kg)   Height: 5' 4\" (1.626 m)     Physical Exam  Constitutional: General: She is not in acute distress. Appearance: Normal appearance. She is not ill-appearing, toxic-appearing or diaphoretic. HENT:      Head: Normocephalic and atraumatic. Eyes:      Extraocular Movements: Extraocular movements intact. Conjunctiva/sclera: Conjunctivae normal.      Pupils: Pupils are equal, round, and reactive to light. Cardiovascular:      Rate and Rhythm: Normal rate and regular rhythm. Pulses: Normal pulses. Heart sounds: Normal heart sounds. No murmur heard. No friction rub. No gallop. Pulmonary:      Effort: Pulmonary effort is normal. No respiratory distress. Breath sounds: No stridor. No wheezing, rhonchi or rales. Chest:      Chest wall: Tenderness present. No mass, deformity, swelling or edema. Comments: tenderness with palpation and with movement of right arm  Musculoskeletal:         General: Tenderness present. No swelling, deformity or signs of injury. Normal range of motion. Cervical back: Normal range of motion. Right lower leg: No edema. Left lower leg: No edema. Skin:     General: Skin is warm and dry. Coloration: Skin is not jaundiced or pale. Findings: No bruising or erythema. Neurological:      General: No focal deficit present. Mental Status: She is alert and oriented to person, place, and time. Psychiatric:         Mood and Affect: Mood normal.         Behavior: Behavior normal.         Thought Content: Thought content normal.         Judgment: Judgment normal.            Patient's past medical history, surgical history, family history, medications,  and allergies  were all reviewed and updated as appropriate today.     Patient Active Problem List   Diagnosis    Palpitations     Past Medical History:   Diagnosis Date    Asthma     Headache       Past Surgical History:   Procedure Laterality Date    WISDOM TOOTH EXTRACTION         Family History   Problem Relation Age of Onset    Diabetes Mother         gestational diabetes    Anemia Mother     No Known Problems Father     No Known Problems Brother     No Known Problems Maternal Grandmother     Diabetes Maternal Grandfather     Prostate Cancer Maternal Grandfather     Cancer Maternal Grandfather         skin    Alzheimer's Disease Paternal Grandmother     Liver Cancer Paternal Grandfather     Alcohol Abuse Paternal Grandfather       No Known Allergies    This chart was generated using the [address] [place] [address] [address] dictation system. I created this record but it may contain dictation errors due to the limitation of the software.

## 2022-02-16 NOTE — PATIENT INSTRUCTIONS
you learn more? Go to [email or web address] and sign in to your [employer] account. Enter O679 in the [employer] box to learn more about \"Muscle Strain: Care Instructions. \"     If you do not have an account, please click on the \"Sign Up Now\" link. Current as of: July 1, 2021               Content Version: 13.1  © 2006-2021 [employer]. Care instructions adapted under license by [facility] ([facility]). If you have questions about a medical condition or this instruction, always ask your healthcare professional. [address] any warranty or liability for your use of this information.

## 2022-11-29 ENCOUNTER — OFFICE VISIT (OUTPATIENT)
Dept: FAMILY MEDICINE CLINIC | Age: 27
End: 2022-11-29
Payer: COMMERCIAL

## 2022-11-29 VITALS
SYSTOLIC BLOOD PRESSURE: 116 MMHG | HEART RATE: 100 BPM | HEIGHT: 64 IN | OXYGEN SATURATION: 98 % | RESPIRATION RATE: 16 BRPM | WEIGHT: 265.4 LBS | BODY MASS INDEX: 45.31 KG/M2 | DIASTOLIC BLOOD PRESSURE: 84 MMHG

## 2022-11-29 DIAGNOSIS — E28.2 PCOS (POLYCYSTIC OVARIAN SYNDROME): Primary | ICD-10-CM

## 2022-11-29 PROCEDURE — G8417 CALC BMI ABV UP PARAM F/U: HCPCS | Performed by: REGISTERED NURSE

## 2022-11-29 PROCEDURE — G8484 FLU IMMUNIZE NO ADMIN: HCPCS | Performed by: REGISTERED NURSE

## 2022-11-29 PROCEDURE — 99213 OFFICE O/P EST LOW 20 MIN: CPT | Performed by: REGISTERED NURSE

## 2022-11-29 PROCEDURE — G8427 DOCREV CUR MEDS BY ELIG CLIN: HCPCS | Performed by: REGISTERED NURSE

## 2022-11-29 PROCEDURE — 1036F TOBACCO NON-USER: CPT | Performed by: REGISTERED NURSE

## 2022-11-29 ASSESSMENT — ENCOUNTER SYMPTOMS
GASTROINTESTINAL NEGATIVE: 1
EYES NEGATIVE: 1
RESPIRATORY NEGATIVE: 1

## 2022-11-29 NOTE — PROGRESS NOTES
Patient: Aleshia Donaldson is a 28 y.o. female who presents today with the following Chief Complaint(s):  Chief Complaint   Patient presents with    Referral - General     Weight management, discuss starting metformin       Assessment/Plan:  1. PCOS (polycystic ovarian syndrome)  Referral to gynecology for further evaluation of PCOS. In the meantime recommend nutrition focus, continuing exercise and improving sleep. Recommend progressive muscle relaxation for stress management. She verbalizes understanding agrees to plan. - Holley Wilson MD, Gynecology, [facility]    More than 50% of this visit was spent in counseling patient. Return in about 2 months (around 1/29/2023), or if symptoms worsen or fail to improve, for Annual check up. HPI:     She is here to discuss her PCOS. She had her current gynecologist for PCOS but does not feel that treatment has been helpful. He was seen by weight management and says that surgery was suggested but this was \"scary\" to her. She would like a second opinion for treatment. She is having more acne and hair growth. He continues exercising and I need a healthy diet. She does not feel that she sleeps well. Current Outpatient Medications   Medication Sig Dispense Refill    norethindrone (MICRONOR) 0.35 MG tablet TAKE 1 TABLET BY ORAL ROUTE EVERY DAY  3     No current facility-administered medications for this visit. Review of Systems   Constitutional: Negative. Eyes: Negative. Respiratory: Negative. Cardiovascular: Negative. Gastrointestinal: Negative. Endocrine: Negative. Genitourinary: Negative. Musculoskeletal: Negative. Skin:         Increased hair growth and use facial acne   Neurological: Negative. Psychiatric/Behavioral:  Positive for sleep disturbance (Poor sleep).       Vitals:    11/29/22 1634   BP: 116/84   Site: Left Upper Arm   Position: Sitting   Cuff Size: Large Adult   Pulse: 100   Resp: 16   SpO2: 98% Pt appeared to sleep 7 hours. No PRNs given or requested. No medical or behavioral events this shift.     Problem: Sleep Disturbance  Goal: Adequate Sleep/Rest  Outcome: No Change   Pt appeared to sleep hours. No PRNs given or requested. No    Weight: 265 lb 6.4 oz (120.4 kg)   Height: 5' 4\" (1.626 m)     Physical Exam  Constitutional:       General: She is not in acute distress. Appearance: Normal appearance. She is not ill-appearing, toxic-appearing or diaphoretic. HENT:      Head: Normocephalic and atraumatic. Nose: Nose normal.      Mouth/Throat:      Mouth: Mucous membranes are moist.   Eyes:      General:         Right eye: No discharge. Left eye: No discharge. Extraocular Movements: Extraocular movements intact. Conjunctiva/sclera: Conjunctivae normal.      Pupils: Pupils are equal, round, and reactive to light. Cardiovascular:      Rate and Rhythm: Normal rate and regular rhythm. Heart sounds: No friction rub. Pulmonary:      Effort: Pulmonary effort is normal. No respiratory distress. Musculoskeletal:         General: Normal range of motion. Cervical back: Normal range of motion. Right lower leg: No edema. Left lower leg: No edema. Skin:     General: Skin is warm and dry. Coloration: Skin is not jaundiced or pale. Findings: No erythema. Comments: Acne scarring visible on bilateral cheeks and neck-difficult to evaluate due to make-up. Moderate amount of dark hair on arms and small amount of dark hair on face. Neurological:      General: No focal deficit present. Mental Status: She is alert and oriented to person, place, and time. Psychiatric:         Mood and Affect: Mood normal.         Behavior: Behavior normal.         Thought Content: Thought content normal.         Judgment: Judgment normal.          Patient's past medical history, surgical history, family history, medications,  and allergies  were all reviewed and updated as appropriate today.     Patient Active Problem List   Diagnosis    Palpitations     Past Medical History:   Diagnosis Date    Asthma     Headache       Past Surgical History:   Procedure Laterality Date    WISDOM TOOTH EXTRACTION Family History   Problem Relation Age of Onset    Diabetes Mother         gestational diabetes    Anemia Mother     No Known Problems Father     No Known Problems Brother     No Known Problems Maternal Grandmother     Diabetes Maternal Grandfather     Prostate Cancer Maternal Grandfather     Cancer Maternal Grandfather         skin    Alzheimer's Disease Paternal Grandmother     Liver Cancer Paternal Grandfather     Alcohol Abuse Paternal Grandfather       No Known Allergies    This chart was generated using the [employer] dictation system. I created this record but it may contain dictation errors due to the limitation of the software.

## 2023-01-19 ENCOUNTER — OFFICE VISIT (OUTPATIENT)
Dept: OBGYN CLINIC | Age: 28
End: 2023-01-19

## 2023-01-19 VITALS
SYSTOLIC BLOOD PRESSURE: 128 MMHG | TEMPERATURE: 97.3 F | DIASTOLIC BLOOD PRESSURE: 82 MMHG | WEIGHT: 268.8 LBS | HEART RATE: 113 BPM | BODY MASS INDEX: 46.14 KG/M2

## 2023-01-19 DIAGNOSIS — E28.2 PCOS (POLYCYSTIC OVARIAN SYNDROME): Primary | ICD-10-CM

## 2023-01-19 NOTE — PROGRESS NOTES
GYN Consult Visit      CC:   Chief Complaint   Patient presents with    New Patient       HPI:  28 y.o. Adina Ravi presents for discussion regarding PCOS. Has been seeing a gynecologist who she likes, but worried she doesn't really understand PCOS and would like a second opinon. Extreme weight gain (weighed 182 in 2016, 213 in , 248 in , 268 in ). Some increased hair growth, shaving her neck and jaw line. Also some increased acne as well. Some mood swings as well. Constantly exhausted, doesn't have energy to do anything. Tries to work-out but can't lose weight. Started on micronor in /, prior to that was on a different birth control pill. Prior to being on any birth control periods were regular but very heavy. Has had bloodwork for PCOS through PCP in . Review of Systems:   Review of Systems   Constitutional:  Positive for fatigue and unexpected weight change. Genitourinary:  Positive for menstrual problem. Obstetric History  OB History    Para Term  AB Living   1       1     SAB IAB Ectopic Molar Multiple Live Births             0      # Outcome Date GA Lbr Tyshawn/2nd Weight Sex Delivery Anes PTL Lv   1 AB 2013               Gynecologic History  Menstrual History:  LMP: 22  Menstrual pattern: irregular,   Sexual History:  Contraception: micronor  Currently is sexually active  Denies history of STIs  No sexual problems  Pap History:  Last pap smear: 2022, normal per pt report  History of abnormal pap smears: denies    Medical History:  Past Medical History:   Diagnosis Date    Asthma     Headache        Surgical History:  Past Surgical History:   Procedure Laterality Date    WISDOM TOOTH EXTRACTION         Medications:  Current Outpatient Medications   Medication Sig Dispense Refill    norethindrone (MICRONOR) 0.35 MG tablet TAKE 1 TABLET BY ORAL ROUTE EVERY DAY  3     No current facility-administered medications for this visit.        Allergies:  No Known Allergies    Social History:  Social History     Socioeconomic History    Marital status: Single     Spouse name: None    Number of children: 0    Years of education: 4.5    Highest education level: None   Occupational History    Occupation: Clinical corrections provider   Tobacco Use    Smoking status: Never    Smokeless tobacco: Never   Vaping Use    Vaping Use: Never used   Substance and Sexual Activity    Alcohol use: Yes     Comment: socially    Drug use: No    Sexual activity: Yes     Partners: Male       Family History:  Family History   Problem Relation Age of Onset    Diabetes Mother         gestational diabetes    Anemia Mother     No Known Problems Father     No Known Problems Brother     No Known Problems Maternal Grandmother     Diabetes Maternal Grandfather     Prostate Cancer Maternal Grandfather     Cancer Maternal Grandfather         skin    Alzheimer's Disease Paternal Grandmother     Liver Cancer Paternal Grandfather     Alcohol Abuse Paternal Grandfather        See above, otherwise denies personal/family history of cervical, uterine, ovarian, vulvar, breast, or colon cancers. Objective:  /82 (Site: Left Upper Arm, Position: Sitting, Cuff Size: Large Adult)   Pulse (!) 113   Temp 97.3 °F (36.3 °C) (Infrared)   Wt 268 lb 12.8 oz (121.9 kg)   LMP 01/11/2023   BMI 46.14 kg/m²     Exam:  Physical Exam  HENT:      Head: Normocephalic. Cardiovascular:      Rate and Rhythm: Normal rate. Pulmonary:      Effort: Pulmonary effort is normal.   Skin:     General: Skin is warm. Neurological:      General: No focal deficit present. Mental Status: She is alert. Psychiatric:         Mood and Affect: Mood normal.       Assessment/Plan:  28 y.o. Livia Hernandez presenting for second opinion regarding PCOS:    1. PCOS (polycystic ovarian syndrome)  Patient with previous diagnosis of PCOS. We discussed the pathogenesis of this as well further testing options and treatment options.  Pt states she is planning to see her regular gyn in April, will discuss further with her and plans to have labs drawn there. All questions answered.     Dispo: PRN Denyce Klinefelter, MD